# Patient Record
Sex: MALE | Race: WHITE | NOT HISPANIC OR LATINO | Employment: UNEMPLOYED | URBAN - METROPOLITAN AREA
[De-identification: names, ages, dates, MRNs, and addresses within clinical notes are randomized per-mention and may not be internally consistent; named-entity substitution may affect disease eponyms.]

---

## 2021-10-15 ENCOUNTER — HOSPITAL ENCOUNTER (INPATIENT)
Facility: CLINIC | Age: 36
LOS: 3 days | Discharge: HOME | End: 2021-10-19
Attending: EMERGENCY MEDICINE | Admitting: INTERNAL MEDICINE
Payer: COMMERCIAL

## 2021-10-15 ENCOUNTER — APPOINTMENT (OUTPATIENT)
Dept: GENERAL RADIOLOGY | Facility: CLINIC | Age: 36
End: 2021-10-15
Attending: EMERGENCY MEDICINE
Payer: COMMERCIAL

## 2021-10-15 DIAGNOSIS — J96.01 ACUTE RESPIRATORY FAILURE WITH HYPOXIA (H): Primary | ICD-10-CM

## 2021-10-15 DIAGNOSIS — T40.604A OPIATE OVERDOSE, UNDETERMINED INTENT, INITIAL ENCOUNTER (H): ICD-10-CM

## 2021-10-15 LAB
ALBUMIN SERPL-MCNC: 3.7 G/DL (ref 3.4–5)
ALP SERPL-CCNC: 102 U/L (ref 40–150)
ALT SERPL W P-5'-P-CCNC: 32 U/L (ref 0–70)
ANION GAP SERPL CALCULATED.3IONS-SCNC: 6 MMOL/L (ref 3–14)
AST SERPL W P-5'-P-CCNC: 26 U/L (ref 0–45)
ATRIAL RATE - MUSE: 102 BPM
BASOPHILS # BLD AUTO: 0.2 10E3/UL (ref 0–0.2)
BASOPHILS NFR BLD AUTO: 1 %
BILIRUB SERPL-MCNC: 0.2 MG/DL (ref 0.2–1.3)
BUN SERPL-MCNC: 12 MG/DL (ref 7–30)
CALCIUM SERPL-MCNC: 7.8 MG/DL (ref 8.5–10.1)
CHLORIDE BLD-SCNC: 104 MMOL/L (ref 94–109)
CO2 SERPL-SCNC: 26 MMOL/L (ref 20–32)
CREAT SERPL-MCNC: 1.05 MG/DL (ref 0.66–1.25)
DIASTOLIC BLOOD PRESSURE - MUSE: NORMAL MMHG
EOSINOPHIL # BLD AUTO: 0.5 10E3/UL (ref 0–0.7)
EOSINOPHIL NFR BLD AUTO: 3 %
ERYTHROCYTE [DISTWIDTH] IN BLOOD BY AUTOMATED COUNT: 13.2 % (ref 10–15)
GFR SERPL CREATININE-BSD FRML MDRD: >90 ML/MIN/1.73M2
GLUCOSE BLD-MCNC: 281 MG/DL (ref 70–99)
HCT VFR BLD AUTO: 41.8 % (ref 40–53)
HGB BLD-MCNC: 14.1 G/DL (ref 13.3–17.7)
IMM GRANULOCYTES # BLD: 0.3 10E3/UL
IMM GRANULOCYTES NFR BLD: 2 %
INTERPRETATION ECG - MUSE: NORMAL
LYMPHOCYTES # BLD AUTO: 4.6 10E3/UL (ref 0.8–5.3)
LYMPHOCYTES NFR BLD AUTO: 23 %
MCH RBC QN AUTO: 30.2 PG (ref 26.5–33)
MCHC RBC AUTO-ENTMCNC: 33.7 G/DL (ref 31.5–36.5)
MCV RBC AUTO: 90 FL (ref 78–100)
MONOCYTES # BLD AUTO: 1.3 10E3/UL (ref 0–1.3)
MONOCYTES NFR BLD AUTO: 6 %
NEUTROPHILS # BLD AUTO: 13.4 10E3/UL (ref 1.6–8.3)
NEUTROPHILS NFR BLD AUTO: 65 %
NRBC # BLD AUTO: 0 10E3/UL
NRBC BLD AUTO-RTO: 0 /100
P AXIS - MUSE: 147 DEGREES
PLATELET # BLD AUTO: 282 10E3/UL (ref 150–450)
POTASSIUM BLD-SCNC: 3.5 MMOL/L (ref 3.4–5.3)
PR INTERVAL - MUSE: 132 MS
PROT SERPL-MCNC: 7.4 G/DL (ref 6.8–8.8)
QRS DURATION - MUSE: 118 MS
QT - MUSE: 370 MS
QTC - MUSE: 482 MS
R AXIS - MUSE: 124 DEGREES
RBC # BLD AUTO: 4.67 10E6/UL (ref 4.4–5.9)
SODIUM SERPL-SCNC: 136 MMOL/L (ref 133–144)
SYSTOLIC BLOOD PRESSURE - MUSE: NORMAL MMHG
T AXIS - MUSE: 109 DEGREES
TROPONIN I SERPL-MCNC: <0.015 UG/L (ref 0–0.04)
VENTRICULAR RATE- MUSE: 102 BPM
WBC # BLD AUTO: 20.3 10E3/UL (ref 4–11)

## 2021-10-15 PROCEDURE — 71045 X-RAY EXAM CHEST 1 VIEW: CPT

## 2021-10-15 PROCEDURE — 85025 COMPLETE CBC W/AUTO DIFF WBC: CPT | Performed by: EMERGENCY MEDICINE

## 2021-10-15 PROCEDURE — 93005 ELECTROCARDIOGRAM TRACING: CPT

## 2021-10-15 PROCEDURE — 99291 CRITICAL CARE FIRST HOUR: CPT | Mod: 25

## 2021-10-15 PROCEDURE — 96376 TX/PRO/DX INJ SAME DRUG ADON: CPT

## 2021-10-15 PROCEDURE — 82040 ASSAY OF SERUM ALBUMIN: CPT | Performed by: EMERGENCY MEDICINE

## 2021-10-15 PROCEDURE — 250N000011 HC RX IP 250 OP 636: Performed by: EMERGENCY MEDICINE

## 2021-10-15 PROCEDURE — 84484 ASSAY OF TROPONIN QUANT: CPT | Performed by: EMERGENCY MEDICINE

## 2021-10-15 PROCEDURE — 96375 TX/PRO/DX INJ NEW DRUG ADDON: CPT

## 2021-10-15 PROCEDURE — 99292 CRITICAL CARE ADDL 30 MIN: CPT

## 2021-10-15 PROCEDURE — 36415 COLL VENOUS BLD VENIPUNCTURE: CPT | Performed by: EMERGENCY MEDICINE

## 2021-10-15 RX ORDER — NALOXONE HYDROCHLORIDE 1 MG/ML
1 INJECTION INTRAMUSCULAR; INTRAVENOUS; SUBCUTANEOUS ONCE
Status: COMPLETED | OUTPATIENT
Start: 2021-10-15 | End: 2021-10-15

## 2021-10-15 RX ORDER — ONDANSETRON 2 MG/ML
4 INJECTION INTRAMUSCULAR; INTRAVENOUS ONCE
Status: COMPLETED | OUTPATIENT
Start: 2021-10-15 | End: 2021-10-16

## 2021-10-15 RX ORDER — NALOXONE HYDROCHLORIDE 0.4 MG/ML
2 INJECTION, SOLUTION INTRAMUSCULAR; INTRAVENOUS; SUBCUTANEOUS ONCE
Status: COMPLETED | OUTPATIENT
Start: 2021-10-15 | End: 2021-10-15

## 2021-10-15 RX ORDER — NALOXONE HYDROCHLORIDE 0.4 MG/ML
1 INJECTION, SOLUTION INTRAMUSCULAR; INTRAVENOUS; SUBCUTANEOUS ONCE
Status: DISCONTINUED | OUTPATIENT
Start: 2021-10-15 | End: 2021-10-15 | Stop reason: CLARIF

## 2021-10-15 RX ADMIN — NALOXONE HYDROCHLORIDE 2 MG: 0.4 INJECTION, SOLUTION INTRAMUSCULAR; INTRAVENOUS; SUBCUTANEOUS at 22:27

## 2021-10-15 RX ADMIN — NALOXONE HYDROCHLORIDE 1 MG: 1 INJECTION PARENTERAL at 20:43

## 2021-10-15 RX ADMIN — ONDANSETRON 4 MG: 2 INJECTION INTRAMUSCULAR; INTRAVENOUS at 21:07

## 2021-10-16 ENCOUNTER — APPOINTMENT (OUTPATIENT)
Dept: CT IMAGING | Facility: CLINIC | Age: 36
End: 2021-10-16
Attending: EMERGENCY MEDICINE
Payer: COMMERCIAL

## 2021-10-16 PROBLEM — J96.01 ACUTE RESPIRATORY FAILURE WITH HYPOXIA (H): Status: ACTIVE | Noted: 2021-10-16

## 2021-10-16 PROBLEM — T40.604A OPIATE OVERDOSE, UNDETERMINED INTENT, INITIAL ENCOUNTER (H): Status: ACTIVE | Noted: 2021-10-16

## 2021-10-16 LAB
AMPHETAMINES UR QL SCN: ABNORMAL
APAP SERPL-MCNC: <2 MG/L (ref 10–30)
BARBITURATES UR QL: ABNORMAL
BENZODIAZ UR QL: ABNORMAL
CANNABINOIDS UR QL SCN: ABNORMAL
COCAINE UR QL: ABNORMAL
ERYTHROCYTE [DISTWIDTH] IN BLOOD BY AUTOMATED COUNT: 13.2 % (ref 10–15)
GLUCOSE BLDC GLUCOMTR-MCNC: 109 MG/DL (ref 70–99)
GLUCOSE BLDC GLUCOMTR-MCNC: 86 MG/DL (ref 70–99)
HBA1C MFR BLD: 4.9 % (ref 0–5.6)
HCT VFR BLD AUTO: 41.8 % (ref 40–53)
HGB BLD-MCNC: 13.4 G/DL (ref 13.3–17.7)
MCH RBC QN AUTO: 29.6 PG (ref 26.5–33)
MCHC RBC AUTO-ENTMCNC: 32.1 G/DL (ref 31.5–36.5)
MCV RBC AUTO: 93 FL (ref 78–100)
OPIATES UR QL SCN: ABNORMAL
PCP UR QL SCN: ABNORMAL
PLATELET # BLD AUTO: 262 10E3/UL (ref 150–450)
RBC # BLD AUTO: 4.52 10E6/UL (ref 4.4–5.9)
SALICYLATES SERPL-MCNC: 3 MG/DL
SARS-COV-2 RNA RESP QL NAA+PROBE: NEGATIVE
WBC # BLD AUTO: 15 10E3/UL (ref 4–11)

## 2021-10-16 PROCEDURE — 36415 COLL VENOUS BLD VENIPUNCTURE: CPT | Performed by: INTERNAL MEDICINE

## 2021-10-16 PROCEDURE — 96375 TX/PRO/DX INJ NEW DRUG ADDON: CPT

## 2021-10-16 PROCEDURE — 99223 1ST HOSP IP/OBS HIGH 75: CPT | Mod: AI | Performed by: INTERNAL MEDICINE

## 2021-10-16 PROCEDURE — 85027 COMPLETE CBC AUTOMATED: CPT | Performed by: INTERNAL MEDICINE

## 2021-10-16 PROCEDURE — 96366 THER/PROPH/DIAG IV INF ADDON: CPT

## 2021-10-16 PROCEDURE — 36415 COLL VENOUS BLD VENIPUNCTURE: CPT | Performed by: EMERGENCY MEDICINE

## 2021-10-16 PROCEDURE — 80307 DRUG TEST PRSMV CHEM ANLYZR: CPT | Performed by: INTERNAL MEDICINE

## 2021-10-16 PROCEDURE — 250N000011 HC RX IP 250 OP 636

## 2021-10-16 PROCEDURE — 250N000011 HC RX IP 250 OP 636: Performed by: EMERGENCY MEDICINE

## 2021-10-16 PROCEDURE — 83036 HEMOGLOBIN GLYCOSYLATED A1C: CPT | Performed by: INTERNAL MEDICINE

## 2021-10-16 PROCEDURE — 250N000013 HC RX MED GY IP 250 OP 250 PS 637: Performed by: INTERNAL MEDICINE

## 2021-10-16 PROCEDURE — 70450 CT HEAD/BRAIN W/O DYE: CPT

## 2021-10-16 PROCEDURE — C9803 HOPD COVID-19 SPEC COLLECT: HCPCS

## 2021-10-16 PROCEDURE — 87635 SARS-COV-2 COVID-19 AMP PRB: CPT | Performed by: EMERGENCY MEDICINE

## 2021-10-16 PROCEDURE — 96376 TX/PRO/DX INJ SAME DRUG ADON: CPT

## 2021-10-16 PROCEDURE — 258N000003 HC RX IP 258 OP 636: Performed by: EMERGENCY MEDICINE

## 2021-10-16 PROCEDURE — 80143 DRUG ASSAY ACETAMINOPHEN: CPT | Performed by: EMERGENCY MEDICINE

## 2021-10-16 PROCEDURE — 96361 HYDRATE IV INFUSION ADD-ON: CPT

## 2021-10-16 PROCEDURE — 96365 THER/PROPH/DIAG IV INF INIT: CPT

## 2021-10-16 PROCEDURE — 250N000011 HC RX IP 250 OP 636: Performed by: INTERNAL MEDICINE

## 2021-10-16 PROCEDURE — 200N000001 HC R&B ICU

## 2021-10-16 PROCEDURE — 80179 DRUG ASSAY SALICYLATE: CPT | Performed by: EMERGENCY MEDICINE

## 2021-10-16 PROCEDURE — 258N000003 HC RX IP 258 OP 636: Performed by: INTERNAL MEDICINE

## 2021-10-16 RX ORDER — QUETIAPINE FUMARATE 100 MG/1
500 TABLET, FILM COATED ORAL AT BEDTIME
COMMUNITY

## 2021-10-16 RX ORDER — PROCHLORPERAZINE MALEATE 5 MG
10 TABLET ORAL EVERY 6 HOURS PRN
Status: DISCONTINUED | OUTPATIENT
Start: 2021-10-16 | End: 2021-10-19 | Stop reason: HOSPADM

## 2021-10-16 RX ORDER — CITALOPRAM HYDROBROMIDE 40 MG/1
40 TABLET ORAL DAILY
Status: ON HOLD | COMMUNITY
End: 2021-10-18

## 2021-10-16 RX ORDER — ONDANSETRON 2 MG/ML
INJECTION INTRAMUSCULAR; INTRAVENOUS
Status: COMPLETED
Start: 2021-10-16 | End: 2021-10-16

## 2021-10-16 RX ORDER — NICOTINE 21 MG/24HR
1 PATCH, TRANSDERMAL 24 HOURS TRANSDERMAL AT BEDTIME
Status: DISCONTINUED | OUTPATIENT
Start: 2021-10-16 | End: 2021-10-19 | Stop reason: HOSPADM

## 2021-10-16 RX ORDER — LORAZEPAM 2 MG/ML
0.5 INJECTION INTRAMUSCULAR EVERY 4 HOURS PRN
Status: DISCONTINUED | OUTPATIENT
Start: 2021-10-16 | End: 2021-10-19 | Stop reason: HOSPADM

## 2021-10-16 RX ORDER — POLYETHYLENE GLYCOL 3350 17 G/17G
17 POWDER, FOR SOLUTION ORAL DAILY PRN
Status: DISCONTINUED | OUTPATIENT
Start: 2021-10-16 | End: 2021-10-19 | Stop reason: HOSPADM

## 2021-10-16 RX ORDER — HYDRALAZINE HYDROCHLORIDE 20 MG/ML
10 INJECTION INTRAMUSCULAR; INTRAVENOUS EVERY 4 HOURS PRN
Status: DISCONTINUED | OUTPATIENT
Start: 2021-10-16 | End: 2021-10-19 | Stop reason: HOSPADM

## 2021-10-16 RX ORDER — PROCHLORPERAZINE 25 MG
25 SUPPOSITORY, RECTAL RECTAL EVERY 12 HOURS PRN
Status: DISCONTINUED | OUTPATIENT
Start: 2021-10-16 | End: 2021-10-19 | Stop reason: HOSPADM

## 2021-10-16 RX ORDER — ACETAMINOPHEN 325 MG/1
650 TABLET ORAL EVERY 8 HOURS PRN
COMMUNITY

## 2021-10-16 RX ORDER — NALOXONE HYDROCHLORIDE 0.4 MG/ML
1 INJECTION, SOLUTION INTRAMUSCULAR; INTRAVENOUS; SUBCUTANEOUS ONCE
Status: COMPLETED | OUTPATIENT
Start: 2021-10-16 | End: 2021-10-16

## 2021-10-16 RX ORDER — IBUPROFEN 600 MG/1
600 TABLET, FILM COATED ORAL EVERY 8 HOURS PRN
COMMUNITY

## 2021-10-16 RX ORDER — SODIUM CHLORIDE, SODIUM LACTATE, POTASSIUM CHLORIDE, CALCIUM CHLORIDE 600; 310; 30; 20 MG/100ML; MG/100ML; MG/100ML; MG/100ML
INJECTION, SOLUTION INTRAVENOUS CONTINUOUS
Status: DISCONTINUED | OUTPATIENT
Start: 2021-10-16 | End: 2021-10-18

## 2021-10-16 RX ORDER — LANOLIN ALCOHOL/MO/W.PET/CERES
3 CREAM (GRAM) TOPICAL
Status: DISCONTINUED | OUTPATIENT
Start: 2021-10-16 | End: 2021-10-19 | Stop reason: HOSPADM

## 2021-10-16 RX ORDER — LIDOCAINE 40 MG/G
CREAM TOPICAL
Status: DISCONTINUED | OUTPATIENT
Start: 2021-10-16 | End: 2021-10-19 | Stop reason: HOSPADM

## 2021-10-16 RX ADMIN — NICOTINE 1 PATCH: 21 PATCH, EXTENDED RELEASE TRANSDERMAL at 20:48

## 2021-10-16 RX ADMIN — SODIUM CHLORIDE, POTASSIUM CHLORIDE, SODIUM LACTATE AND CALCIUM CHLORIDE: 600; 310; 30; 20 INJECTION, SOLUTION INTRAVENOUS at 11:50

## 2021-10-16 RX ADMIN — SODIUM CHLORIDE, POTASSIUM CHLORIDE, SODIUM LACTATE AND CALCIUM CHLORIDE: 600; 310; 30; 20 INJECTION, SOLUTION INTRAVENOUS at 19:38

## 2021-10-16 RX ADMIN — NALOXONE HYDROCHLORIDE 0.3 MG/HR: 1 INJECTION PARENTERAL at 18:50

## 2021-10-16 RX ADMIN — SODIUM CHLORIDE 1000 ML: 9 INJECTION, SOLUTION INTRAVENOUS at 01:09

## 2021-10-16 RX ADMIN — NALOXONE HYDROCHLORIDE 1.3 MG/HR: 1 INJECTION PARENTERAL at 01:06

## 2021-10-16 RX ADMIN — ONDANSETRON 4 MG: 2 INJECTION INTRAMUSCULAR; INTRAVENOUS at 08:28

## 2021-10-16 RX ADMIN — SODIUM CHLORIDE, POTASSIUM CHLORIDE, SODIUM LACTATE AND CALCIUM CHLORIDE: 600; 310; 30; 20 INJECTION, SOLUTION INTRAVENOUS at 03:45

## 2021-10-16 RX ADMIN — NALOXONE HYDROCHLORIDE 0.5 MG/HR: 1 INJECTION PARENTERAL at 10:01

## 2021-10-16 RX ADMIN — NALOXONE HYDROCHLORIDE 1 MG: 0.4 INJECTION, SOLUTION INTRAMUSCULAR; INTRAVENOUS; SUBCUTANEOUS at 00:18

## 2021-10-16 RX ADMIN — NALOXONE HYDROCHLORIDE 1.3 MG/HR: 1 INJECTION PARENTERAL at 03:27

## 2021-10-16 ASSESSMENT — ACTIVITIES OF DAILY LIVING (ADL)
ADLS_ACUITY_SCORE: 12
ADLS_ACUITY_SCORE: 12

## 2021-10-16 NOTE — ED TRIAGE NOTES
Pt staying at his grandma's house;  Grandma heard him fall in the bathroom and called EMS.  EMS found his pupils to be pinpoint and pt sats were 40% and administered 1mg narcan with response from pt.  On the way here pt required 2 x 0.5mg doses of narcan.

## 2021-10-16 NOTE — PHARMACY-ADMISSION MEDICATION HISTORY
Pharmacy Medication History  Admission medication history interview status for the 10/15/2021  admission is complete. See EPIC admission navigator for prior to admission medications     Location of Interview: Patient room  Medication history sources: Patient, Surescripts and Care Everywhere    Significant changes made to the medication list:  Added all medications on the list    In the past week, patient estimated taking medication this percent of the time: greater than 90%      Medication reconciliation completed by provider prior to medication history? Yes    Time spent in this activity: 15 minutes    Prior to Admission medications    Medication Sig Last Dose Taking? Auth Provider   acetaminophen (TYLENOL) 325 MG tablet Take 650 mg by mouth every 8 hours as needed for mild pain prn at prn Yes Unknown, Entered By History   citalopram (CELEXA) 40 MG tablet Take 40 mg by mouth daily 10/15/2021 at pm Yes Unknown, Entered By History   ibuprofen (ADVIL/MOTRIN) 600 MG tablet Take 600 mg by mouth every 8 hours as needed for moderate pain prn at prn Yes Unknown, Entered By History   paliperidone (INVEGA TRINZA) 546 MG/1.75ML SHARATH intramuscular 3 month injection Inject 546 mg into the muscle every 3 months 8/19/2021 Yes Unknown, Entered By History   QUEtiapine (SEROQUEL) 100 MG tablet Take 500 mg by mouth At Bedtime 10/15/2021 at pm Yes Unknown, Entered By History       The information provided in this note is only as accurate as the sources available at the time of update(s)

## 2021-10-16 NOTE — ED NOTES
Bed: ED13  Expected date: 10/15/21  Expected time: 8:15 PM  Means of arrival: Ambulance  Comments:  HEMS 416 36M narcan save, od

## 2021-10-16 NOTE — H&P
[facility]    History and Physical - Hospitalist Service       Date of Admission:  10/15/2021    Assessment & Plan      Shashank Taylor is a 36 year old male admitted on 10/15/2021. He presents to the emergency department with acute hypoxic respiratory failure in the setting of suspected unintentional opiate overdose requiring frequent readministration of Narcan.    Acute hypoxic respiratory failure suspect secondary to unintentional opiate overdose: Patient with a distant history of IV heroin use, intranasal cocaine use, daily marijuana use.  He tells me that he has not used any narcotics intentionally, believes the marijuana he smoked around dinnertime shortly before he became unresponsive might have been laced.  Hypoxic and unresponsive, mental status corrected with Narcan, though has required repeated dosing.  -Informed regarding OTC Narcan for himself or acquaintances if opiate abuse is occurring within his social [place]  -Narcan drip initiating in the emergency department, down titrate as able to maintain respiratory rate greater than 10 and patient calm, cooperative.  Reducing dose from that initially ordered in the emergency department as it appears that patient has gone into acute opiate withdrawal with bolus dosing of Narcan with significant tachycardia and hypertension, nausea with emesis.  Raises concern for more chronic opiate use  -Urine drug screen  -Capnography/oximetry monitoring  -Oxygen as needed    Schizoaffective disorder: Follows through [facility] psychiatry.  -resume PTA celexa, seroquel when reconciled by pharmacy. High dose seroquel w/ borderline QTc (482), but stable dose since August.  -on Invega Trinza q3m as an outpatient as well; last admin 8/19/21.  -Avoid additional QTC prolonging agents as able; potassium, magnesium replacement protocols in place    History of suicidal ideation and suicide attempt: History of violent suicide attempt when he tried cutting his  "neck.  Reports he is not suicidal, currently feels safe, and \"want[s] to live.\"    History of homelessness: Last homeless winter 2020. Currently living w/ grandmother.  He does not consider himself homeless currently    Hyperglycemia: Blood glucose of 281.  Suspect secondary to adrenal response with hypoxia and respiratory failure given clinical presentation.  Note hemoglobin A1c of 5.1 9/25/2020.  Despite elevated blood glucose, I do not anticipate or suspect diabetes diagnosis  -Hemoglobin A1c added on; if elevated, can add sliding scale insulin or consider long-acting insulin for discharge    Leukocytosis: Patient with a profound leukocytosis in the 20,000 range.  No fevers or chills or preceding illness.  In the context of clinical presentation as well as hyperglycemia with previously normal hemoglobin A1c I anticipate this is stress demargination.  -Monitor for infectious signs or symptoms  -Recheck CBC in a.m. to ensure improvement/resolution    Tobacco use disorder:  Smokes 1 pack/day  -Nicotine patch panel in place  -Continue to encourage cessation       Diet:  N.p.o. for now given nausea with emesis with recent bolus Narcan dosing and acute opiate withdrawal.  If this resolves with lower dose Narcan drip and patient's mental status is appropriate, can advance diet as tolerated  DVT Prophylaxis: Pneumatic compression devices  Hernández Catheter: Not present  Central Lines: None  Code Status:  Full code    Clinically Significant Risk Factors Present on Admission          # Hypocalcemia: Ca = 7.8 mg/dL (Ref range: 8.5 - 10.1 mg/dL) and/or iCa = N/A on admission, will replace as needed          Disposition Plan   Expected discharge:  anticipated discharge likely 24 hours recommended to prior living arrangement once Mental status clears, hypoxia resolved off of Narcan, safe disposition plan in place.       The patient's care was discussed with the Bedside Nurse, Patient and Dr. William the emergency " department.    Charbel Sharif MD  [facility]  Securely message with the [employer] Web Console (learn more here)  Text page via [employer] Paging/Directory      ______________________________________________________________________    Chief Complaint   Acute hypoxic respiratory failure    History is obtained from patient, chart review, discussion with Dr. William the emergency department, review of outside records including psychiatry follow-up through [facility], prior hemoglobin A1c testing.    History of Present Illness   Shashank Taylor is a 36 year old male who is brought to the emergency department by EMS after he had loss of consciousness and his grandmother's bathroom.    Patient with a history of homelessness, schizoaffective disorder, polysubstance use/abuse, tobacco use disorder.  He has been living with his grandmother since this spring.  Tonight, patient tells me that he smoked marijuana which he gets from his friend, and has gotten from his friend in the past sometime around 6 PM.  He ate dinner, then went to the bathroom.  He has no recollection of the next event, though his grandmother heard a thump as he fell to the ground, found him unresponsive and called EMS.  Patient with oxygen saturations in the 60 range, received 1 mg Narcan and became responsive and was able to ambulate to the ambulance.  In route, patient once again became nonresponsive; required 0.5 mg dosing on 2 occurrences during transport with response.    Here in the emergency department, again required intermittent redosing of Narcan for hypoxia and somnolence.  Note that with bolus dosing of Narcan it appears that patient went into acute opiate withdrawal with blood pressures into the 170 systolic range, heart rate in the 120s range, and nausea with emesis.  Some emesis was provoked with patient gagging himself secondary to nausea.  Heart rate normalizes in the 80s as patient becomes  more sedate with Narcan wearing off.    Given need for repeat dosing of Narcan, Narcan drip was initiated in the emergency department and admission was requested.    Met with patient in the emergency department.  He is calm and cooperative, slightly sleepy.  Heart rate in the 80s.  He tells me that he has not been using any narcotic that he is aware of, not been taking any pain pills.  He does have a distant history of IV heroin use, though tells me that this was many years ago.  Somewhat recent urine drug screens over the past years through outside system seem to note a more recent issue with cocaine use, which patient tells me that he historically has used intranasally, though again has not been using recently.  He reports that he uses cigarettes, 1 pack/day, and smokes marijuana somewhat regularly.  Tells me that his supply of marijuana was from a friend where he has received marijuana before.  He is not aware of taking any narcotic medications despite discussion regarding his response to Narcan.  Believes perhaps his marijuana was laced.  Was not smoking marijuana with anyone at the time, so uncertain if others had symptoms.    Patient currently not feeling nauseous, though was nauseated and had some emesis after Narcan as above.    Review of Systems    The 10 point Review of Systems is negative other than noted in the HPI or here.  No fevers or chills    Past Medical History    I have reviewed this patient's medical history and updated it with pertinent information if needed.   Narcotic use disorder in reported sustained mission  Tobacco use disorder  Marijuana use disorder  Schizoaffective disorder  Depression with history of suicide attempt  History of homelessness    Past Surgical History   I have reviewed this patient's surgical history and updated it with pertinent information if needed.   buttock abscess I&D    Social History   I have reviewed this patient's social history and updated it with pertinent  information if needed.  Social History     Tobacco Use     Smoking status: 1ppd current smoker   Substance Use Topics     Alcohol use: occasional     Drug use: Marijuana; here w/ suspected opiate overdose.        Family History     Uncle committed suicide    Prior to Admission Medications   Celexa, Seroquel, IM Invega every 3 months     Allergies   No Known Allergies    Physical Exam   Vital Signs: Temp: 98.1  F (36.7  C)   BP: (!) 175/96 Pulse: (!) 122   Resp: (!) 7 SpO2: 91 % O2 Device: Oxymask Oxygen Delivery: 5 LPM    General Appearance: Fit 36-year-old male resting comfortably on hospitals following prior Narcan administration.  He does not appear ill or toxic  Eyes: Mild scleral injection bilaterally, no scleral icterus  HEENT: Normocephalic, atraumatic  Respiratory: Breath sounds are clear bilateral to auscultation, no wheeze or crackles, excellent effort  Cardiovascular: Regular rate and rhythm.  Heart rate currently in the mid 80s; had episodes of tachycardia in the emergency department, though in discussion with nursing staff these were after Narcan administration  GI: Abdomen soft, nontender palpation.  No palpable mass.  Lymph/Hematologic: No lower extremity edema  Genitourinary: Not examined  Skin: Very tanned.  No rashes appreciable.  Some scar tissue on forearms bilaterally which is hypopigmented  Musculoskeletal: Muscular tone and bulk intact and appropriate for age.  Well-developed.  Neurologic: Patient is sleepy, though currently alert, conversant, appropriate in conversation.  Calm and cooperative at this juncture, though mental status has been waxing and waning in relation to bolus Narcan doses in the emergency department prior to my arrival  Psychiatric: Normal affect, pleasant    Data   Data reviewed today: I reviewed all medications, new labs and imaging results over the last 24 hours. I personally reviewed no images or EKG's today.    Recent Labs   Lab 10/15/21  2050   WBC 20.3*   HGB  14.1   MCV 90         POTASSIUM 3.5   CHLORIDE 104   CO2 26   BUN 12   CR 1.05   ANIONGAP 6   LORENZO 7.8*   *   ALBUMIN 3.7   PROTTOTAL 7.4   BILITOTAL 0.2   ALKPHOS 102   ALT 32   AST 26   TROPONIN <0.015

## 2021-10-16 NOTE — ED NOTES
Pt RR ranging between 6-10; Pt wakes easily, but RR quickly falls when pt falls back to sleep.  MD aware.

## 2021-10-16 NOTE — ED PROVIDER NOTES
History   Chief Complaint:  Drug Overdose    The history is limited by the condition of the patient.      Shashank Taylor is a 36 year old male with a history of substance abuse who presents with a drug overdose. Per report, the patient was at his grandmothers house, where he stays during the winter, and she heard him fall in the bathroom. Secondary to this, she called EMS. EMS was initially unable to get into the bathroom as he was leaning against the door. When EMS got to him his O2 level was 40%. He was given 2mg Narcan en rout.     Review of Systems   Unable to perform ROS: Mental status change     Allergies:  The patient has no known allergies.    Medications:    No current outpatient medications on file.    Past Medical History:    Substance use disorder  PE  Schizoaffective disorder  Achilles tendonitis of both lower extremities  Tinnitus of right ear  Chlamydia  Asthma  Depression    Family History:    Brother: Depression  Father: Alcohol abuse, Depression, Drug abuse  Mother: Diabetes, Hypertension    Social History:  The patient was accompanied to the ED by EMS.    Physical Exam     Patient Vitals for the past 24 hrs:   BP Temp Pulse Resp SpO2   10/16/21 0006 -- -- (!) 122 (!) 7 91 %   10/16/21 0000 (!) 175/96 -- 111 22 93 %   10/15/21 2330 -- -- -- -- 95 %   10/15/21 2300 -- -- -- -- 99 %   10/15/21 2246 -- -- -- -- 100 %   10/15/21 2230 -- -- -- -- 97 %   10/15/21 2112 -- -- 101 25 98 %   10/15/21 2109 -- -- 112 11 --   10/15/21 2035 -- -- -- 10 --   10/15/21 2023 -- 98.1  F (36.7  C) -- -- (!) 87 %   10/15/21 2021 (!) 172/87 -- 114 -- --       Physical Exam  Vitals: reviewed by me  General: Pt seen on [facility], somnolent but arousable to heavy stimulation.  Eyes: Tracking poorly  ENT: MMM, midline trachea.  No evidence of trauma to head or neck  Lungs: Very low respiratory rate, no tachypnea  CV: Rate as above  Abd: Soft, non tender, no guarding, no rebound. Non distended  MSK: no joint  effusion.  No evidence of trauma  Skin: No rash  Neuro: Sleepy but arousable, moving all extremities  Psych: Deferred      Emergency Department Course   ECG:  ECG taken at 2129  Unusual P axis, possible ectopic atrial tachycardia  Incomplete right bundle branch block  Left posterior fasicular block  Nonspecific T wave abnormality  Abnormal ECG  Rate 102 bpm. ME interval 132 ms. QRS duration 118 ms. QT/QTc 370/482 ms. P-R-T axes 147 124 109.    Imaging:  CT Head w/o Contrast   Final Result   IMPRESSION:   1.  No CT finding of a mass, hemorrhage or focal area suggestive of acute infarct.      XR Chest Port 1 View   Final Result   IMPRESSION: Small calcified granuloma in the right upper lobe. The lungs are otherwise clear. No pneumothorax. Pulmonary vascularity is within normal limits.          Laboratory:  Labs Ordered and Resulted from Time of ED Arrival Up to the Time of Departure from the ED   COMPREHENSIVE METABOLIC PANEL - Abnormal; Notable for the following components:       Result Value    Calcium 7.8 (*)     Glucose 281 (*)     All other components within normal limits   CBC WITH PLATELETS AND DIFFERENTIAL - Abnormal; Notable for the following components:    WBC Count 20.3 (*)     Absolute Neutrophils 13.4 (*)     Absolute Immature Granulocytes 0.3 (*)     All other components within normal limits   TROPONIN I - Normal   ACETAMINOPHEN LEVEL   SALICYLATE LEVEL   COVID-19 VIRUS (CORONAVIRUS) BY PCR   CBC WITH PLATELETS & DIFFERENTIAL    Narrative:     The following orders were created for panel order CBC with platelets differential.  Procedure                               Abnormality         Status                     ---------                               -----------         ------                     CBC with platelets and d...[[id number]]  Abnormal            Final result                 Please view results for these tests on the individual orders.   URINE DRUGS OF ABUSE SCREEN     Emergency Department  Course:    Reviewed:  I reviewed nursing notes, vitals, past history and care everywhere    Assessments:  2037 I obtained history and examined the patient as noted above.      I rechecked and updated the patient regarding the imaging results, laboratory results and the plan for care.    Consults:    I spoke with the hospitalist, Dr. Sharif, regarding placement for the patient.     Interventions:  Medications   naloxone (NARCAN) 2.5 mg in sodium chloride 0.9 % 250 mL infusion (has no administration in time range)   naloxone (NARCAN) injection 1 mg (1 mg Intravenous Given 10/15/21 2043)   ondansetron (ZOFRAN) injection 4 mg (4 mg Intravenous Given 10/15/21 2107)   naloxone (NARCAN) injection 2 mg (2 mg Intravenous Given 10/15/21 2227)   naloxone (NARCAN) injection 1 mg (1 mg Intravenous Given 10/16/21 0018)     Disposition:  The patient was admitted to the hospital under the care of Dr. Sharif.    Impression & Plan    Medical Decision Making:  This is a 36-year-old male presents to emergency room with what appears to be an opiate overdose.  He continues to respond temporarily to Narcan, and got 2 mg prior to arrival, and then on his third dose in the emergency room, I placed on a Narcan drip as the Narcan's affect was only short-lived, usually less than 30 minutes.  He is however able to wake up and tell me his name, and the month and the year.  He is doing well now on the Narcan drip, and will be admitted to the care of Dr. Sharif to the ICU setting.  If he clears before we get an ICU bed, it is of course possibly be discharged from the ER, though at this time again he is quite ill-appearing and still subdued to a certain degree even on the Narcan drip.  We will plan for careful monitoring, otherwise his altered mental status work-up was essentially negative, and he will be monitored very carefully here.  No signs of sepsis, I think leukocytosis is likely reactionary      Critical Care time:  was 40 minutes for this  patient excluding procedures.    Diagnosis:    ICD-10-CM    1. Opiate overdose, undetermined intent, initial encounter (H)  T40.604A      Scribe Disclosure:  I, Luther Olivares, am serving as a scribe at 8:41 PM on 10/15/2021 to document services personally performed by Judah William MD based on my observations and the provider's statements to me.      Judah William MD  10/16/21 0152

## 2021-10-16 NOTE — ED NOTES
Pt had episode of emesis after sticking fingers down his throat;  Pt given zofran and told he could have more instead of making himself throw up.  MD aware.

## 2021-10-16 NOTE — ED NOTES
Pt needed more narcan due to decreased respiratory drive and having to be continuously aroused to breathe.

## 2021-10-16 NOTE — ED NOTES
With narcan drip off O2 sats dropped to low 80's with oxygen on. Narcan drip restarted. Was unable to void.

## 2021-10-16 NOTE — ED NOTES
Pt becoming increasingly more somnolent. resp rate 6 when sleeping. Pox 68%RA. MD notified. Pt placed on 5L NC. Pt remains on capnography and reads in 50s. Pt's pupils pinpoint. Pt awakens to voice but quickly falls asleep. Narcan gtt restarted. Continue to monitor closely.

## 2021-10-16 NOTE — ED NOTES
"Pt sticking his fingers down his throat in an attempt to throw up;  Pt successful.  Rn told pt that this is not a great method and he can take meds for the nausea if he would like.  Pt states that he is concerned about \"staying for hours\" and is wondering if he can get a sleep med;  Pt educated as to why he will not be able to receive a sleep medication.  "

## 2021-10-16 NOTE — ED NOTES
[facility]  ED Nurse Handoff Report    ED Chief complaint: Drug Overdose      ED Diagnosis:   Final diagnoses:   Opiate overdose, undetermined intent, initial encounter (H)       Code Status:not addressed    Allergies: No Known Allergies    Patient Story: He's staying with Grandmother. She heard him fall in the bathroom and called EMS. Ems found him to have pinpoint pupils with oxygen sats 40%. Was given 1mg of narcan with some response and again given 2 doses of 0.5mg narcan enroute.  Focused Assessment:  Pt continued to desaturate with decreased RR after given 3 more boluses of narcan equaling 4mg then narcan drip started. Pt given sips of water and ice chips when alert after narcan boluses. He admits to using marijuana but nothing else.     Treatments and/or interventions provided: IV narcan, labs, head CT, IV fluids  Patient's response to treatments and/or interventions: continues to have decreased RR drive    To be done/followed up on inpatient unit:  monitor RR status    Does this patient have any cognitive concerns?: Alcohol/Drugs temporary cognitive concerns    Activity level - Baseline/Home:  Independent  Activity Level - Current:   Stand with Assist    Patient's Preferred language: English   Needed?: No    Isolation: None  Infection: Not Applicable  Patient tested for COVID 19 prior to admission: YES  Bariatric?: No    Vital Signs:   Vitals:    10/16/21 0006 10/16/21 0030 10/16/21 0100 10/16/21 0106   BP:  (!) 157/100 (!) 162/99    Pulse: (!) 122 100 98 96   Resp: (!) 7 13  (!) 5   Temp:       SpO2: 91% 99%  92%       Cardiac Rhythm:Cardiac Rhythm: Sinus tachycardia    Was the PSS-3 completed:   Yes  What interventions are required if any?               Family Comments: lives with Grandmother  OBS brochure/video discussed/provided to patient/family: N/A              Name of person given brochure if not patient:               Relationship to patient:     For the majority of the  shift this patient's behavior was Green.   Behavioral interventions performed were .    ED NURSE PHONE NUMBER: [phone]

## 2021-10-16 NOTE — ED NOTES
Pt RR 4/minute. ETCO2 40-45. Pt arouses easily, but falls back to sleep. sats 99% on 5L NC. MD notified and at bedside. Narcan gtt increased to 0.5mg/hr per MD order

## 2021-10-16 NOTE — PROGRESS NOTES
Narcan gtt off [id number]. Pt with RR 5-10, ETCO2 55. Pt rousing to voice, narcan restarted.    Pt asked if he would like family notified of admission. Pt called and spoke to grandmother, Winsome, whom he lives with. Pt gave number of #[phone]. This writer attempted to contact to give update and no answer. Pt stated it's okay to give updates to Winsome should she call.

## 2021-10-17 LAB
ALBUMIN SERPL-MCNC: 3.2 G/DL (ref 3.4–5)
ALP SERPL-CCNC: 80 U/L (ref 40–150)
ALT SERPL W P-5'-P-CCNC: 39 U/L (ref 0–70)
AST SERPL W P-5'-P-CCNC: 50 U/L (ref 0–45)
BILIRUB DIRECT SERPL-MCNC: 0.1 MG/DL (ref 0–0.2)
BILIRUB SERPL-MCNC: 0.5 MG/DL (ref 0.2–1.3)
ERYTHROCYTE [DISTWIDTH] IN BLOOD BY AUTOMATED COUNT: 12.5 % (ref 10–15)
HCT VFR BLD AUTO: 39.8 % (ref 40–53)
HGB BLD-MCNC: 13.3 G/DL (ref 13.3–17.7)
MCH RBC QN AUTO: 30.3 PG (ref 26.5–33)
MCHC RBC AUTO-ENTMCNC: 33.4 G/DL (ref 31.5–36.5)
MCV RBC AUTO: 91 FL (ref 78–100)
PLATELET # BLD AUTO: 213 10E3/UL (ref 150–450)
PROT SERPL-MCNC: 6.7 G/DL (ref 6.8–8.8)
RBC # BLD AUTO: 4.39 10E6/UL (ref 4.4–5.9)
WBC # BLD AUTO: 8.7 10E3/UL (ref 4–11)

## 2021-10-17 PROCEDURE — 250N000013 HC RX MED GY IP 250 OP 250 PS 637: Performed by: INTERNAL MEDICINE

## 2021-10-17 PROCEDURE — 85027 COMPLETE CBC AUTOMATED: CPT | Performed by: INTERNAL MEDICINE

## 2021-10-17 PROCEDURE — 250N000013 HC RX MED GY IP 250 OP 250 PS 637: Performed by: HOSPITALIST

## 2021-10-17 PROCEDURE — 120N000001 HC R&B MED SURG/OB

## 2021-10-17 PROCEDURE — 250N000011 HC RX IP 250 OP 636: Performed by: HOSPITALIST

## 2021-10-17 PROCEDURE — 80076 HEPATIC FUNCTION PANEL: CPT | Performed by: INTERNAL MEDICINE

## 2021-10-17 PROCEDURE — 99233 SBSQ HOSP IP/OBS HIGH 50: CPT | Performed by: HOSPITALIST

## 2021-10-17 PROCEDURE — 258N000003 HC RX IP 258 OP 636: Performed by: INTERNAL MEDICINE

## 2021-10-17 PROCEDURE — 250N000011 HC RX IP 250 OP 636: Performed by: INTERNAL MEDICINE

## 2021-10-17 PROCEDURE — 258N000003 HC RX IP 258 OP 636: Performed by: HOSPITALIST

## 2021-10-17 PROCEDURE — 36415 COLL VENOUS BLD VENIPUNCTURE: CPT | Performed by: INTERNAL MEDICINE

## 2021-10-17 RX ORDER — ACETAMINOPHEN 500 MG
1000 TABLET ORAL EVERY 6 HOURS PRN
Status: DISCONTINUED | OUTPATIENT
Start: 2021-10-17 | End: 2021-10-19 | Stop reason: HOSPADM

## 2021-10-17 RX ADMIN — ACETAMINOPHEN 1000 MG: 500 TABLET, FILM COATED ORAL at 02:33

## 2021-10-17 RX ADMIN — MELATONIN TAB 3 MG 3 MG: 3 TAB at 02:33

## 2021-10-17 RX ADMIN — SODIUM CHLORIDE, POTASSIUM CHLORIDE, SODIUM LACTATE AND CALCIUM CHLORIDE: 600; 310; 30; 20 INJECTION, SOLUTION INTRAVENOUS at 11:22

## 2021-10-17 RX ADMIN — SODIUM CHLORIDE, POTASSIUM CHLORIDE, SODIUM LACTATE AND CALCIUM CHLORIDE: 600; 310; 30; 20 INJECTION, SOLUTION INTRAVENOUS at 19:33

## 2021-10-17 RX ADMIN — PROCHLORPERAZINE EDISYLATE 10 MG: 5 INJECTION INTRAMUSCULAR; INTRAVENOUS at 14:54

## 2021-10-17 RX ADMIN — NALOXONE HYDROCHLORIDE 0.3 MG/HR: 1 INJECTION PARENTERAL at 02:24

## 2021-10-17 RX ADMIN — SODIUM CHLORIDE, POTASSIUM CHLORIDE, SODIUM LACTATE AND CALCIUM CHLORIDE: 600; 310; 30; 20 INJECTION, SOLUTION INTRAVENOUS at 02:33

## 2021-10-17 ASSESSMENT — ACTIVITIES OF DAILY LIVING (ADL)
ADLS_ACUITY_SCORE: 12

## 2021-10-17 NOTE — PROGRESS NOTES
Transferred from ICU. Alert and oriented. Nauseated. Compazine 10 mg IV given. Taking sips of water and occasional ice chips. /82. Pulse 63. Oxygen 96% on 2 liters. Requesting his seroquel be ordered for tonight. IV infusing. Stable.

## 2021-10-17 NOTE — PROGRESS NOTES
Occasional PCO2 to 60 when deep asleep, normally 40's W/A.  C/O being tired, resting in between meals, cares.  Cooperative, no C/O pain.  Emesis x1, moderate nausea, poor appetite for lunch, adequate H20 intake.  Plan to transfer to [facility] soon.

## 2021-10-17 NOTE — PLAN OF CARE
"Attempted to wean Narcan off. Patient's respiratory rate dropped to 6 after approximately 30 minutes off Narcan. ETCO2 30-40's. Patient removed Nicotine patch due to \"nightmares\". Headache overnight relieved by Tylenol. SR. BP stable. Good urine output. Patient was intermittently restless/anixious throughout shift. Resting well at this time.   "

## 2021-10-17 NOTE — PROGRESS NOTES
"[facility]    Medicine Progress Note - Hospitalist Service       Date of Admission:  10/15/2021    Assessment & Plan              Shashank Taylor is a 36 year old male admitted on 10/15/2021. He presents to the emergency department with acute hypoxic respiratory failure in the setting of suspected unintentional opiate overdose requiring frequent readministration of Narcan.    Acute hypoxic respiratory failure suspect secondary to unintentional opiate overdose:   Patient with a distant history of IV heroin use, intranasal cocaine use, daily marijuana use.    Unintentional overdose.  Transferred to the ICU for the need for ongoing narcan infusion.  -Urine drug screen revealed cannabinoids  -continue with capnography/oximetry monitoring  -Oxygen as needed, currently requiring 1 -2 litre   - Transfer out of ICU when floor bed becomes available     Schizoaffective disorder: [place] [facility] [place]  -resume PTA celexa, seroquel . High dose seroquel w/ borderline QTc (482), but stable dose since August.  -on Invega Trinza q3m as an outpatient as well; last admin 8/19/21.  -Avoid additional QTC prolonging agents as able; potassium, magnesium replacement protocols in place    History of suicidal ideation and suicide attempt: History of violent suicide attempt when he tried cutting his neck.  Reports he is not suicidal, currently feels safe, and \"wants to live.\"    History of homelessness: Last homeless winter 2020. Currently living w/ grandmother.  He does not consider himself homeless currently    Hyperglycemia: Resolved  Blood glucose of 281 on admission , and thought to be secondary to stress response with hypoxia and respiratory failure given clinical presentation.  Note hemoglobin A1c of 5.1 9/25/2020.        Leukocytosis:  Improved  No fevers or chills or preceding illness.Likeley stress demargination.    Tobacco use disorder:  Smokes 1 pack/day  -Nicotine patch panel in " place  -Continue to encourage cessation         Diet: Advance Diet as Tolerated: Regular Diet Adult    DVT Prophylaxis: Enoxaparin (Lovenox) SQ  Hernández Catheter: Not present  Central Lines: None  Code Status: Full Code      Disposition Plan   Expected discharge:  Discharge  Home likely tomorrow  prior living arrangement once mental status at baseline and Not requiring supplemnetal oxygen.     The patient's care was discussed with the Bedside Nurse.    Jesus Ayoub MD  Hospitalist Service  [facility]  Securely message with the Vocera Web Console (learn more here)  Text page via [employer] Paging/Directory      Clinically Significant Risk Factors Present on Admission               ______________________________________________________________________    Interval History   Lying in bed in ICU, cooperative  No new complaints     Data reviewed today: I reviewed all medications, new labs and imaging results over the last 24 hours. I personally reviewed no images or EKG's today.    Physical Exam   Vital Signs: Temp: 99.1  F (37.3  C) Temp src: Oral BP: (!) 159/91 Pulse: 70   Resp: 11 SpO2: 98 % O2 Device: Nasal cannula Oxygen Delivery: 2 LPM  Weight: 218 lbs 7.61 oz  General Appearance: Somnolent, lying in ICU bed  Respiratory:  CTA no wheezing or crackles   Cardiovascular: RRR no murmurs or gallops  GI:  Soft NT/ND + BS  Skin:  Warm no acute rashes  Other:      Data   Recent Labs   Lab 10/17/21  1015 10/16/21  2152 10/16/21  1623 10/16/21  0656 10/15/21  2050   WBC 8.7  --   --  15.0* 20.3*   HGB 13.3  --   --  13.4 14.1   MCV 91  --   --  93 90     --   --  262 282   NA  --   --   --   --  136   POTASSIUM  --   --   --   --  3.5   CHLORIDE  --   --   --   --  104   CO2  --   --   --   --  26   BUN  --   --   --   --  12   CR  --   --   --   --  1.05   ANIONGAP  --   --   --   --  6   LORENZO  --   --   --   --  7.8*   GLC  --  109* 86  --  281*   ALBUMIN 3.2*  --   --   --  3.7    PROTTOTAL 6.7*  --   --   --  7.4   BILITOTAL 0.5  --   --   --  0.2   ALKPHOS 80  --   --   --  102   ALT 39  --   --   --  32   AST 50*  --   --   --  26   TROPONIN  --   --   --   --  <0.015

## 2021-10-18 PROCEDURE — 258N000003 HC RX IP 258 OP 636: Performed by: HOSPITALIST

## 2021-10-18 PROCEDURE — 250N000013 HC RX MED GY IP 250 OP 250 PS 637: Performed by: HOSPITALIST

## 2021-10-18 PROCEDURE — 250N000011 HC RX IP 250 OP 636: Performed by: INTERNAL MEDICINE

## 2021-10-18 PROCEDURE — 120N000001 HC R&B MED SURG/OB

## 2021-10-18 PROCEDURE — 250N000013 HC RX MED GY IP 250 OP 250 PS 637: Performed by: NURSE PRACTITIONER

## 2021-10-18 PROCEDURE — 99232 SBSQ HOSP IP/OBS MODERATE 35: CPT | Performed by: INTERNAL MEDICINE

## 2021-10-18 RX ORDER — CITALOPRAM HYDROBROMIDE 40 MG/1
40 TABLET ORAL DAILY
Qty: 30 TABLET | Refills: 0 | Status: SHIPPED | OUTPATIENT
Start: 2021-10-18 | End: 2021-10-19

## 2021-10-18 RX ORDER — CITALOPRAM HYDROBROMIDE 10 MG/1
40 TABLET ORAL DAILY
Status: DISCONTINUED | OUTPATIENT
Start: 2021-10-18 | End: 2021-10-19 | Stop reason: HOSPADM

## 2021-10-18 RX ORDER — ONDANSETRON 4 MG/1
4 TABLET, ORALLY DISINTEGRATING ORAL EVERY 6 HOURS PRN
Status: DISCONTINUED | OUTPATIENT
Start: 2021-10-18 | End: 2021-10-19 | Stop reason: HOSPADM

## 2021-10-18 RX ORDER — ONDANSETRON 2 MG/ML
4 INJECTION INTRAMUSCULAR; INTRAVENOUS EVERY 6 HOURS PRN
Status: DISCONTINUED | OUTPATIENT
Start: 2021-10-18 | End: 2021-10-19 | Stop reason: HOSPADM

## 2021-10-18 RX ADMIN — MELATONIN TAB 3 MG 3 MG: 3 TAB at 19:52

## 2021-10-18 RX ADMIN — ONDANSETRON 4 MG: 4 TABLET, ORALLY DISINTEGRATING ORAL at 12:37

## 2021-10-18 RX ADMIN — QUETIAPINE 500 MG: 400 TABLET ORAL at 21:55

## 2021-10-18 RX ADMIN — PROCHLORPERAZINE MALEATE 10 MG: 5 TABLET ORAL at 06:52

## 2021-10-18 RX ADMIN — MELATONIN TAB 3 MG 3 MG: 3 TAB at 01:33

## 2021-10-18 RX ADMIN — SODIUM CHLORIDE, POTASSIUM CHLORIDE, SODIUM LACTATE AND CALCIUM CHLORIDE: 600; 310; 30; 20 INJECTION, SOLUTION INTRAVENOUS at 03:21

## 2021-10-18 ASSESSMENT — ACTIVITIES OF DAILY LIVING (ADL)
ADLS_ACUITY_SCORE: 12

## 2021-10-18 NOTE — PROGRESS NOTES
"[facility]    HOSPITALIST PROGRESS NOTE :   --------------------------------------------------    Date of Admission:  10/15/2021    Cumulative Summary: Shashank Taylor is a 36 year old male admitted on 10/15/2021. He presents to the emergency department with acute hypoxic respiratory failure in the setting of suspected unintentional opiate overdose requiring frequent readministration of Narcan.    Assessment & Plan     Acute hypoxic respiratory failure suspect secondary to unintentional opiate overdose:   Drug induced encephalopathy:  Patient with a distant history of IV heroin use, intranasal cocaine use, daily marijuana use.    Unintentional overdose.  Transferred to the ICU for the need for ongoing narcan infusion.    -- Patient care was assumed this morning , seen and examined  -- Discontinue IV fluids   -- Has been off Narcan infusion , will stop   -- add Zofran   --Urine drug screen revealed cannabinoids  -- will discontinue capnography/oximetry monitoring tomorrow morning   --Oxygen as needed, currently requiring 1 -2 litre   -- Patient thinks that he might be ready tomorrow      Schizoaffective disorder: Follows through [facility] [place]ry.  --resume PTA Celexa and seroquel . High dose seroquel w/ borderline QTc (482), but stable dose since August.  --on Invega Trinza q3m as an outpatient as well; last admin 8/19/21.  --Avoid additional QTC prolonging agents as able; potassium, magnesium replacement protocols in place  -- patient will need prescription of Seroquel on discharge      History of suicidal ideation and suicide attempt: History of violent suicide attempt when he tried cutting his neck.  Reports he is not suicidal, currently feels safe, and \"wants to live.\"     History of homelessness: Last homeless winter 2020. Currently living w/ grandmother.  --  He does not consider himself homeless currently, although told me this morning that he does not have further place to " live     Hyperglycemia: Resolved  Blood glucose of 281 on admission , and thought to be secondary to stress response with hypoxia and respiratory failure given clinical presentation.  Note hemoglobin A1c of 5.1 9/25/2020.        Leukocytosis:  Improved  --No fevers or chills or preceding illness.Likeley stress demargination.     Tobacco use disorder:  Smokes 1 pack/day  --Nicotine patch panel in place  --Continue to encourage cessation     Clinically Significant Risk Factors Present on Admission               Diet: Advance Diet as Tolerated: Regular Diet Adult    Hernández Catheter: Not present  DVT Prophylaxis: Pneumatic Compression Devices  Code Status: Full Code    The patient's care was discussed with the Bedside Nurse and Patient.    Disposition Plan   Expected discharge: 10/19/2021   recommended to prior living arrangement once O2 use less than 1 liters/minute.  Entered: Tash Rene MD 10/18/2021, 12:02 PM     [facility]ene MD, [facility]  Text Page (7am - 6pm)    ----------------------------------------------------------------------------------------------------------------------    Interval History   Patient care was assumed this morning , seen and examined, feeling better but significantly nauseated , does not think that he is ready to be discharged today , would like his PTA Seroquel to be tonight     -Data reviewed today: I reviewed all new labs and imaging results over the last 24 hours.    I personally reviewed no images or EKG's today.    Physical Exam   Temp: 99  F (37.2  C) Temp src: Oral BP: (!) 146/82 Pulse: 61   Resp: 12 SpO2: 96 % O2 Device: Nasal cannula Oxygen Delivery: 2 LPM  Vitals:    10/17/21 0400   Weight: 99.1 kg (218 lb 7.6 oz)     Vital Signs with Ranges  Temp:  [97.8  F (36.6  C)-99.3  F (37.4  C)] 99  F (37.2  C)  Pulse:  [61-95] 61  Resp:  [10-16] 12  BP: (129-157)/(77-94) 146/82  SpO2:  [95 %-98 %] 96 %  I/O last 3 completed shifts:  In: 3544.58 [P.O.:2480; I.V.:1064.58]  Out: 8101  [Urine:7800; Emesis/NG output:301]    GENERAL: Alert , awake and oriented. NAD. Conversational, appropriate.   HEENT: Normocephalic. EOMI. No icterus or injection. Nares normal.   LUNGS: Clear to auscultation. No dyspnea at rest.   HEART: Regular rate. Extremities perfused.   ABDOMEN: Soft, nontender, and nondistended. Positive bowel sounds.   EXTREMITIES: No LE edema noted.   NEUROLOGIC: Moves extremities x4 on command. No acute focal neurologic abnormalities noted.     Medications     lactated ringers 125 mL/hr at 10/18/21 0321     naloxone (NARCAN) infusion ADULT - opioid reversal Stopped (10/17/21 6370)       nicotine  1 patch Transdermal At Bedtime     nicotine   Transdermal Q8H     sodium chloride (PF)  3 mL Intracatheter Q8H       Data   Recent Labs   Lab 10/17/21  1015 10/16/21  2152 10/16/21  1623 10/16/21  0656 10/15/21  2050   WBC 8.7  --   --  15.0* 20.3*   HGB 13.3  --   --  13.4 14.1   MCV 91  --   --  93 90     --   --  262 282   NA  --   --   --   --  136   POTASSIUM  --   --   --   --  3.5   CHLORIDE  --   --   --   --  104   CO2  --   --   --   --  26   BUN  --   --   --   --  12   CR  --   --   --   --  1.05   ANIONGAP  --   --   --   --  6   LORENZO  --   --   --   --  7.8*   GLC  --  109* 86  --  281*   ALBUMIN 3.2*  --   --   --  3.7   PROTTOTAL 6.7*  --   --   --  7.4   BILITOTAL 0.5  --   --   --  0.2   ALKPHOS 80  --   --   --  102   ALT 39  --   --   --  32   AST 50*  --   --   --  26   TROPONIN  --   --   --   --  <0.015       Imaging:   No results found for this or any previous visit (from the past 24 hour(s)).

## 2021-10-18 NOTE — PROGRESS NOTES
A&O x. VSS on RA. Lungs clear. BS+, BM-, flatus+. Tolerating diet. + N/V. Received PRN zofran x1. PRN compazine available. Voiding good output. Denied pain. Up independent in room.

## 2021-10-18 NOTE — PLAN OF CARE
.Date/Time: October 18, 2021 5:08 AM   Reason for Admission: Opiate Overdose    Cognitive/Mentation: AOx3    VS:VSS, on 2L NC B/P: 146/77, T: 99.3, P: 64, R: 12     Gi: Nauseous, prn Compazine given   : Continent, uses urinal. Voiding adequately.   Pain: Denies pain      Skin:No skin problems  Activity: Independent   Diet: Regular      Discharge:Possible 10/18/21 when not requiring O2.    End of shift summary: No events overnight

## 2021-10-18 NOTE — PLAN OF CARE
Pt is AOx4, anxious at times, reports feeling dizzy when ambulating, had 1 emesis episode, tele SR, denies pain, on RA O2>92%, voiding, tolerating some diet. Independent in room. Possible discharge tomorrow.

## 2021-10-19 VITALS
DIASTOLIC BLOOD PRESSURE: 88 MMHG | WEIGHT: 218.48 LBS | SYSTOLIC BLOOD PRESSURE: 134 MMHG | TEMPERATURE: 97.7 F | RESPIRATION RATE: 18 BRPM | HEART RATE: 70 BPM | OXYGEN SATURATION: 95 %

## 2021-10-19 PROCEDURE — 93005 ELECTROCARDIOGRAM TRACING: CPT

## 2021-10-19 PROCEDURE — 250N000013 HC RX MED GY IP 250 OP 250 PS 637: Performed by: NURSE PRACTITIONER

## 2021-10-19 PROCEDURE — 99238 HOSP IP/OBS DSCHRG MGMT 30/<: CPT | Performed by: INTERNAL MEDICINE

## 2021-10-19 RX ORDER — CITALOPRAM HYDROBROMIDE 40 MG/1
40 TABLET ORAL DAILY
Qty: 30 TABLET | Refills: 0 | Status: SHIPPED | OUTPATIENT
Start: 2021-10-19

## 2021-10-19 RX ADMIN — CITALOPRAM HYDROBROMIDE 40 MG: 10 TABLET ORAL at 08:50

## 2021-10-19 ASSESSMENT — ACTIVITIES OF DAILY LIVING (ADL)
ADLS_ACUITY_SCORE: 12

## 2021-10-19 NOTE — DISCHARGE SUMMARY
Pt was discharged on 10/19/2021 at 0940. Pt was in a stable medical condition; 96% O2 on room air. Pt took the bus to his grandma's house. Said he had money for transportation. Discharge instructions were explained to pt. He admitted to understanding the instructions. Pt accidentally took discharge signature paper with him.

## 2021-10-19 NOTE — PLAN OF CARE
Pt a&ox4, vss on room air, up independently, regular diet, voiding adequately, denies pain, denies shortness of breath, ivsl, tele SR, plan to discharge home today. Patient slept between cares.

## 2021-10-19 NOTE — PLAN OF CARE
VSS on room air all shift.  Pt denies pain. Up independently in the room. Tolerated regular diet has some int. Nausea. Voiding well in the urinal.

## 2021-10-19 NOTE — DISCHARGE SUMMARY
[facility]  Hospitalist Discharge Summary      Date of Admission:  10/15/2021  Date of Discharge:  10/19/2021  Discharging Provider: Tash Rene MD, FACP    Discharge Diagnoses   Acute hypoxic respiratory failure secondary to drug overdose, resolved.  Unintentional opiate overdose.  Drug-induced encephalopathy, resolved.  Schizoaffective disorder, stable.  Remote history of suicidal ideation and suicidal attempt, stable no suicidal ideations at this point.  History of homelessness.  Hyperglycemia, resolved.  Leukocytosis, improved.  Tobacco use disorder.    Follow-ups Needed After Discharge   Follow-up Appointments     Follow-up and recommended labs and tests       Follow up with primary care provider, Physician No Ref-Primary, within 7   days for hospital follow- up.  No follow up labs or test are needed.             Unresulted Labs Ordered in the Past 30 Days of this Admission     No orders found from 9/15/2021 to 10/16/2021.          Discharge Disposition   Discharged to home  Condition at discharge: Stable    Hospital Course    Cumulative Summary: Shashank Taylor is a 36 year old male admitted on 10/15/2021. He presents to the emergency department with acute hypoxic respiratory failure in the setting of suspected unintentional opiate overdose requiring frequent readministration of Narcan.Here are further details regarding his current hospitalization      Acute hypoxic respiratory failure suspect secondary to unintentional opiate overdose:   Drug induced encephalopathy:  Patient with a distant history of IV heroin use, intranasal cocaine use, daily marijuana use.    Unintentional overdose.  Transferred to the ICU for the need for ongoing narcan infusion.     -- Patient was seen and examined this morning , feeling well, nausea is resolved   -- Off IV fluids   -- Has been off Narcan infusion for more than 48 hours   --Urine drug screen revealed cannabinoids  -- will discontinue  "capnography/oximetry monitoring this morning   -- Patient has been weaned off oxygen and feels that he is ready for discharge      Schizoaffective disorder: [place]s through [facility] [place] [place].  --resumed PTA Celexa and seroquel . High dose seroquel w/ borderline QTc (482), but stable dose since August.  --on Invega Trinza q3m as an outpatient as well; last admin 8/19/21.  --Avoid additional QTC prolonging agents as able; potassium, magnesium replacement protocols in place  -- patient was given prescription of celexa on discharge , will need further refills from the primary team.     History of suicidal ideation and suicide attempt: History of violent suicide attempt when he tried cutting his neck.  Reports he is not suicidal, currently feels safe, and \"wants to live.\"     History of homelessness: Last homeless winter 2020. Currently living w/ grandmother.  --  He does not consider himself homeless currently, has been staying with his grand mother.     Hyperglycemia: Resolved  Blood glucose of 281 on admission , and thought to be secondary to stress response with hypoxia and respiratory failure given clinical presentation.  Note hemoglobin A1c of 5.1 9/25/2020.        Leukocytosis:  Improved  -- No fevers or chills or preceding illness.Likeley stress demargination.     Tobacco use disorder:  Smokes 1 pack/day  -- Nicotine patch panel in place  -- Continue to encourage cessation    Patient was seen and examined on the day of discharge , he is feeling well, does not have any complaints , I did review the discharge medications and instructions with the patient and plan for him to follow up with the PCP after the hospitalization .patient was in agreement , he is discharged in stable condition back to his home.    Consultations This Hospital Stay   None    Code Status   Full Code    Time Spent on this Encounter   I, Tash Rene MD, personally saw the patient today and spent less than or equal to 30 minutes discharging this " patient.     Tash Rene MD, FACP  [facility]  [address] [place] [address]  Phone: [phone]  Fax: [phone]  ______________________________________________________________________    Physical Exam   Vital Signs: Temp: 98.3  F (36.8  C) Temp src: Oral BP: 118/73 Pulse: 72   Resp: 18 SpO2: 94 % O2 Device: None (Room air) Oxygen Delivery: 2 LPM  Weight: 218 lbs 7.61 oz    Physical Exam  Vitals and nursing note reviewed.   Constitutional:       Appearance: He is well-developed.   HENT:      Head: Normocephalic and atraumatic.   Eyes:      Pupils: Pupils are equal, round, and reactive to light.   Neck:      Thyroid: No thyromegaly.   Cardiovascular:      Rate and Rhythm: Normal rate and regular rhythm.      Heart sounds: Normal heart sounds.   Pulmonary:      Effort: Pulmonary effort is normal. No respiratory distress.      Breath sounds: Normal breath sounds.   Abdominal:      General: Bowel sounds are normal. There is no distension.      Palpations: Abdomen is soft.   Musculoskeletal:         General: No tenderness. Normal range of motion.      Cervical back: Normal range of motion and neck supple.   Skin:     General: Skin is warm and dry.   Neurological:      Mental Status: He is alert and oriented to person, place, and time.   Psychiatric:         Behavior: Behavior normal.          Primary Care Physician   Physician No Ref-Primary    Discharge Orders      Reason for your hospital stay    You were admitted to the hospital secondary to drug induced encephalopathy and resp depression.     Follow-up and recommended labs and tests     Follow up with primary care provider, Physician No Ref-Primary, within 7 days for hospital follow- up.  No follow up labs or test are needed.     Activity    Your activity upon discharge: activity as tolerated and no driving for today     Discharge Instructions    Please stay compliant with all the medications , you will need to get  further refills from your psychiatrist     Full Code     Diet    Follow this diet upon discharge: Orders Placed This Encounter      Advance Diet as Tolerated: Regular Diet Adult         Significant Results and Procedures   Results for orders placed or performed during the hospital encounter of 10/15/21   XR Chest Port 1 View    Narrative    EXAM: XR CHEST PORT 1 VIEW  LOCATION: [facility]  DATE/TIME: 10/15/2021 8:55 PM    INDICATION: Shortness of breath.  COMPARISON: None.      Impression    IMPRESSION: Small calcified granuloma in the right upper lobe. The lungs are otherwise clear. No pneumothorax. Pulmonary vascularity is within normal limits.   CT Head w/o Contrast    Narrative    EXAM: CT HEAD W/O CONTRAST  LOCATION: [facility]  DATE/TIME: 10/16/2021 1:07 AM    INDICATION: Mental status change, unknown cause  COMPARISON: 03/20/2010.  TECHNIQUE: Routine CT Head without IV contrast. Multiplanar reformats. Dose reduction techniques were used.    FINDINGS:  INTRACRANIAL CONTENTS: No intracranial hemorrhage, extraaxial collection, or mass effect.  No CT evidence of acute infarct. Normal parenchymal attenuation. Normal ventricles and sulci.     VISUALIZED ORBITS/SINUSES/MASTOIDS: No intraorbital abnormality. No paranasal sinus mucosal disease. No middle ear or mastoid effusion.    BONES/SOFT TISSUES: No acute abnormality.      Impression    IMPRESSION:  1.  No CT finding of a mass, hemorrhage or focal area suggestive of acute infarct.       Discharge Medications   Current Discharge Medication List      CONTINUE these medications which have CHANGED    Details   citalopram (CELEXA) 40 MG tablet Take 1 tablet (40 mg) by mouth daily  Qty: 30 tablet, Refills: 0    Associated Diagnoses: Opiate overdose, undetermined intent, initial encounter (H); Acute respiratory failure with hypoxia (H)         CONTINUE these medications which have NOT CHANGED    Details   acetaminophen  (TYLENOL) 325 MG tablet Take 650 mg by mouth every 8 hours as needed for mild pain      ibuprofen (ADVIL/MOTRIN) 600 MG tablet Take 600 mg by mouth every 8 hours as needed for moderate pain      paliperidone (INVEGA TRINZA) 546 MG/1.75ML SHARATH intramuscular 3 month injection Inject 546 mg into the muscle every 3 months      QUEtiapine (SEROQUEL) 100 MG tablet Take 500 mg by mouth At Bedtime           Allergies   No Known Allergies

## 2021-10-19 NOTE — PROGRESS NOTES
Writer spoke with Float Resource Nurse and decided EKG can wait until day shift as pt is sleeping.

## 2021-10-19 NOTE — PROVIDER NOTIFICATION
Hospitalist service cross cover:    Paged by nursing staff regarding resuming PTA seroquel. Patient takes 500 mg at bedtime (!) PTA, I see this on his [facility] records which is where he gets psychiatry care.     PLAN:  -- 12 lead ECG to measure QTc  -- resume seroquel and celexa at PTA doses    Please page with additional concerns,     LUAN Zamarripa, CNP  Hospitalist  Text Page  [phone])

## 2021-10-20 ENCOUNTER — PATIENT OUTREACH (OUTPATIENT)
Dept: CARE COORDINATION | Facility: CLINIC | Age: 36
End: 2021-10-20

## 2021-10-20 DIAGNOSIS — Z71.89 OTHER SPECIFIED COUNSELING: ICD-10-CM

## 2021-10-20 NOTE — PROGRESS NOTES
Clinic Care Coordination Contact  [facility]: Post-Discharge Note  SITUATION                                                      Admission:    Admission Date: 10/15/21   Reason for Admission: Acute hypoxic respiratory failure secondary to drug overdose, resolved.Unintentional opiate overdose.Drug-induced encephalopathy, resolved.Schizoaffective disorder, stable.Remote history of suicidal ideation and suicidal attempt, stable no suicidal ideations at this point.History of homelessness.Hyperglycemia, resolved.Leukocytosis, improved.Tobacco use disorder.  Discharge:   Discharge Date: 10/19/21  Discharge Diagnosis: Acute hypoxic respiratory failure secondary to drug overdose, resolved.Unintentional opiate overdose.Drug-induced encephalopathy, resolved.Schizoaffective disorder, stable.Remote history of suicidal ideation and suicidal attempt, stable no suicidal ideations at this point.History of homelessness.Hyperglycemia, resolved.Leukocytosis, improved.Tobacco use disorder.    BACKGROUND                                                      Cumulative Summary: Shashank Taylor is a 36 year old male admitted on 10/15/2021. He presents to the emergency department with acute hypoxic respiratory failure in the setting of suspected unintentional opiate overdose requiring frequent readministration of Narcan.Here are further details regarding his current hospitalization      Acute hypoxic respiratory failure suspect secondary to unintentional opiate overdose:   Drug induced encephalopathy:  Patient with a distant history of IV heroin use, intranasal cocaine use, daily marijuana use.    Unintentional overdose.  Transferred to the ICU for the need for ongoing narcan infusion.     -- Patient was seen and examined this morning , feeling well, nausea is resolved   -- Off IV fluids   -- Has been off Narcan infusion for more than 48 hours   --Urine drug screen revealed cannabinoids  -- will discontinue capnography/oximetry  "monitoring this morning   -- Patient has been weaned off oxygen and feels that he is ready for discharge      Schizoaffective disorder: Follows through [facility].  --resumed PTA Celexa and seroquel . High dose seroquel w/ borderline QTc (482), but stable dose since August.  --on Invega Trinza q3m as an outpatient as well; last admin 8/19/21.  --Avoid additional QTC prolonging agents as able; potassium, magnesium replacement protocols in place  -- patient was given prescription of celexa on discharge , will need further refills from the primary team.     History of suicidal ideation and suicide attempt: History of violent suicide attempt when he tried cutting his neck.  Reports he is not suicidal, currently feels safe, and \"wants to live.\"     History of homelessness: Last homeless winter 2020. Currently living w/ grandmother.  --  He does not consider himself homeless currently, has been staying with his grand mother.     Hyperglycemia: Resolved  Blood glucose of 281 on admission , and thought to be secondary to stress response with hypoxia and respiratory failure given clinical presentation.  Note hemoglobin A1c of 5.1 9/25/2020.        Leukocytosis:  Improved  -- No fevers or chills or preceding illness.Likeley stress demargination.     Tobacco use disorder:  Smokes 1 pack/day  -- Nicotine patch panel in place  -- Continue to encourage cessation     Patient was seen and examined on the day of discharge , he is feeling well, does not have any complaints , I did review the discharge medications and instructions with the patient and plan for him to follow up with the PCP after the hospitalization .patient was in agreement , he is discharged in stable condition back to his home.       ASSESSMENT      Enrollment  Primary Care Care Coordination Status: Not a Candidate    Discharge Assessment  How are you doing now that you are home?: Pt reports that he is feeling pretty good, still groggy. Pt shares that he just " applied for disability and is waiting to hear back on the decision, states that if he were approved this would help. SW informs pt that if he is denied for some reason that there are resources for appealing. Pt asks to send resources to [email or web address]  How are your symptoms? (Red Flag symptoms escalate to triage hotline per guidelines): Improved  Do you feel your condition is stable enough to be safe at home until your provider visit?: Yes  Does the patient have their discharge instructions? : Yes  Does the patient have questions regarding their discharge instructions? : No  Were you started on any new medications or were there changes to any of your previous medications? : Yes  Does the patient have all of their medications?: Yes  Do you have questions regarding any of your medications? : No  Do you have all of your needed medical supplies or equipment (DME)?  (i.e. oxygen tank, CPAP, cane, etc.): Yes  Discharge follow-up appointment scheduled within 14 calendar days? : Yes  Discharge Follow Up Appointment Date: 10/21/21 ([facility])  Discharge Follow Up Appointment Scheduled with?: Primary Care Provider    Post-op ([facility] Only)  If the patient had a surgery or procedure, do they have any questions for a nurse?: No    Post-op (Clinicians Only)  Did the patient have surgery or a procedure: No  Fever: No  Chills: No      PLAN                                                      Outpatient Plan:    Follow up with primary care provider, Physician No Ref-Primary, within 7 days for hospital follow- up.  No follow up labs or test are needed.          No future appointments.      For any urgent concerns, please contact our 24 hour nurse triage line: [phone] ([phone])         MARIANO Ornelas

## 2021-10-21 LAB
ATRIAL RATE - MUSE: 72 BPM
DIASTOLIC BLOOD PRESSURE - MUSE: NORMAL MMHG
INTERPRETATION ECG - MUSE: NORMAL
P AXIS - MUSE: 49 DEGREES
PR INTERVAL - MUSE: 130 MS
QRS DURATION - MUSE: 100 MS
QT - MUSE: 424 MS
QTC - MUSE: 464 MS
R AXIS - MUSE: 71 DEGREES
SYSTOLIC BLOOD PRESSURE - MUSE: NORMAL MMHG
T AXIS - MUSE: 48 DEGREES
VENTRICULAR RATE- MUSE: 72 BPM

## 2023-01-01 ENCOUNTER — HOSPITAL ENCOUNTER (EMERGENCY)
Facility: CLINIC | Age: 38
Discharge: HOME | End: 2023-04-28
Attending: EMERGENCY MEDICINE | Admitting: EMERGENCY MEDICINE
Payer: COMMERCIAL

## 2023-01-01 VITALS
OXYGEN SATURATION: 100 % | BODY MASS INDEX: 23.03 KG/M2 | HEIGHT: 72 IN | RESPIRATION RATE: 20 BRPM | HEART RATE: 72 BPM | TEMPERATURE: 98 F | DIASTOLIC BLOOD PRESSURE: 65 MMHG | WEIGHT: 170 LBS | SYSTOLIC BLOOD PRESSURE: 134 MMHG

## 2023-01-01 DIAGNOSIS — U07.1 INFECTION DUE TO 2019 NOVEL CORONAVIRUS: ICD-10-CM

## 2023-01-01 DIAGNOSIS — F15.10 METHAMPHETAMINE ABUSE, EPISODIC (H): ICD-10-CM

## 2023-01-01 DIAGNOSIS — F19.94 SUBSTANCE INDUCED MOOD DISORDER (H): ICD-10-CM

## 2023-01-01 LAB — SARS-COV-2 RNA RESP QL NAA+PROBE: POSITIVE

## 2023-01-01 PROCEDURE — C9803 HOPD COVID-19 SPEC COLLECT: HCPCS

## 2023-01-01 PROCEDURE — U0003 INFECTIOUS AGENT DETECTION BY NUCLEIC ACID (DNA OR RNA); SEVERE ACUTE RESPIRATORY SYNDROME CORONAVIRUS 2 (SARS-COV-2) (CORONAVIRUS DISEASE [COVID-19]), AMPLIFIED PROBE TECHNIQUE, MAKING USE OF HIGH THROUGHPUT TECHNOLOGIES AS DESCRIBED BY CMS-2020-01-R: HCPCS | Performed by: EMERGENCY MEDICINE

## 2023-01-01 PROCEDURE — U0005 INFEC AGEN DETEC AMPLI PROBE: HCPCS | Performed by: EMERGENCY MEDICINE

## 2023-01-01 PROCEDURE — 99283 EMERGENCY DEPT VISIT LOW MDM: CPT | Mod: CS

## 2023-04-28 NOTE — ED PROVIDER NOTES
History     Chief Complaint:  Psychiatric Evaluation    The history is provided by the patient.      Shashank Taylor is a 37 year old male with a history of substance abuse and depression who presents for a psychiatric evaluation. The patient reports he is paranoid and thinks people are trying to hurt him. States that he feels suicidal but would not go through with any plan. Notes that he used methamphetamines a few days ago. Adds that he is looking for a place to stay.    Independent Historian:   None - Patient Only    Review of External Notes: prior ed visits    ROS:  Review of Systems   Psychiatric/Behavioral: Positive for suicidal ideas.   All other systems reviewed and are negative.    Allergies:  No Known Allergies     Medications:    Citalopram  Acetaminophen  Paliperidone  Quetiapine     Past Medical History:    Opiate overdose  Acute respiratory failure with hypoxia  Cannabis use   Pulmonary embolism  Cocaine use disorder  Alcohol use disorder  Schizoaffective disorder  Hemorrhagic shock  Anemia  Asthma  Depression     Past Surgical History:    ECT     Family History:    Depression  Alcohol abuse  Drug abuse  Hypertension     Social History:  Presents alone  Presents via private vehicle    Physical Exam     Patient Vitals for the past 24 hrs:   BP Temp Temp src Pulse Resp SpO2 Height Weight   04/28/23 1227 134/65 98  F (36.7  C) Oral 72 20 100 % 1.829 m (6') 77.1 kg (170 lb)      Physical Exam  Vitals reviewed.   HENT:      Right Ear: Tympanic membrane normal.   Cardiovascular:      Rate and Rhythm: Normal rate.   Pulmonary:      Effort: Pulmonary effort is normal.   Abdominal:      General: Abdomen is flat.      Palpations: Abdomen is soft.   Musculoskeletal:         General: Normal range of motion.   Skin:     General: Skin is warm.      Capillary Refill: Capillary refill takes less than 2 seconds.   Neurological:      General: No focal deficit present.      Mental Status: He is alert and oriented to  person, place, and time.   Psychiatric:      Comments: Well-appearing washing his hands.  Is alert to time date and place.  Admits to methamphetamine abuse a few days ago.  Was in a shelter but since left.       Emergency Department Course   Laboratory:  Labs Ordered and Resulted from Time of ED Arrival to Time of ED Departure   COVID-19 VIRUS (CORONAVIRUS) BY PCR - Abnormal       Result Value    SARS CoV2 PCR Positive (*)       Emergency Department Course & Assessments:     Interventions:  Medications - No data to display     Assessments:  1447 I obtained history and examined the patient as noted above.    Independent Interpretation (X-rays, CTs, rhythm strip):  None    Consultations/Discussion of Management or Tests:  None     Social Determinants of Health affecting care:   None    Disposition:  The patient was discharged to home.     Impression & Plan    Medical Decision Making:  Patient is a 37-year-old male who with ports to the emergency room with concerns for suicidality depression and being homeless as well as methamphetamine abuse.  Review of his chart patient's been seen as an outpatient on multiple occasions.  Suspect some component of secondary gain as patient is known to be homeless and abusing drugs.  Patient is well-appearing states he has been psychotic but shows no signs of being predistracted or altered mental status.  Patient is aware of time date and place.  Vitals are stable.  Patient is COVID-positive unsure how long he has been symptomatic from COVID as patient cannot give a symptom corresponding to COVID illness.  Based on this history I am unable to place offer the patient to either empath or psychiatry.  Did consider boarding the patient in the emergency room but due to ED boarding and overcrowding lack of facilities and resources in the setting where patient clearly has a cyst substance-induced mood disorder patient was offered resources as an outpatient for shelters and was discharged  home in stable condition without concerns for active suicidal ideation or risk for self or self-harm.    Diagnosis:    ICD-10-CM    1. Methamphetamine abuse, episodic (H)  F15.10       2. Substance induced mood disorder (H)  F19.94       3. Infection due to 2019 novel coronavirus  U07.1          Scribe Disclosure:  I, Sandra Chow, am serving as a scribe at 2:51 PM on 4/28/2023 to document services personally performed by Jonnie Rod MD based on my observations and the provider's statements to me.     4/28/2023   Jonnie Rod MD Goodman, Brian Samuel, MD  04/28/23 [id number]

## 2023-04-28 NOTE — DISCHARGE INSTRUCTIONS
Due to your COVID-positive test the empath unit in the hospital does not have any ability to admit you.  Please avoid methamphetamines and drugs as these can complicate your mental health picture.  We have offered you some medication to assist in agitation but you have refused.  Please follow-up with the resources as provided and return with any other concerns.  Thanks for your patience today.